# Patient Record
Sex: FEMALE | Race: WHITE | URBAN - METROPOLITAN AREA
[De-identification: names, ages, dates, MRNs, and addresses within clinical notes are randomized per-mention and may not be internally consistent; named-entity substitution may affect disease eponyms.]

---

## 2017-10-16 ENCOUNTER — FOLLOW UP ESTABLISHED (OUTPATIENT)
Dept: URBAN - METROPOLITAN AREA CLINIC 10 | Facility: CLINIC | Age: 62
End: 2017-10-16
Payer: COMMERCIAL

## 2017-10-16 PROCEDURE — 92015 DETERMINE REFRACTIVE STATE: CPT | Performed by: OPTOMETRIST

## 2017-10-16 PROCEDURE — 92014 COMPRE OPH EXAM EST PT 1/>: CPT | Performed by: OPTOMETRIST

## 2017-10-16 PROCEDURE — 92134 CPTRZ OPH DX IMG PST SGM RTA: CPT | Performed by: OPTOMETRIST

## 2017-10-16 ASSESSMENT — KERATOMETRY
OS: 44.88
OD: 44.50

## 2017-10-16 ASSESSMENT — INTRAOCULAR PRESSURE
OD: 17
OS: 18

## 2017-10-16 ASSESSMENT — VISUAL ACUITY
OS: 20/20
OD: 20/20

## 2018-10-19 ENCOUNTER — FOLLOW UP ESTABLISHED (OUTPATIENT)
Dept: URBAN - METROPOLITAN AREA CLINIC 10 | Facility: CLINIC | Age: 63
End: 2018-10-19
Payer: COMMERCIAL

## 2018-10-19 DIAGNOSIS — R73.03 OTHER ABNORMAL GLUCOSE: ICD-10-CM

## 2018-10-19 PROCEDURE — 92014 COMPRE OPH EXAM EST PT 1/>: CPT | Performed by: OPTOMETRIST

## 2018-10-19 PROCEDURE — 92134 CPTRZ OPH DX IMG PST SGM RTA: CPT | Performed by: OPTOMETRIST

## 2018-10-19 PROCEDURE — 92015 DETERMINE REFRACTIVE STATE: CPT | Performed by: OPTOMETRIST

## 2018-10-19 ASSESSMENT — INTRAOCULAR PRESSURE
OS: 20
OD: 17

## 2018-10-19 ASSESSMENT — VISUAL ACUITY
OS: 20/20
OD: 20/20

## 2018-10-19 ASSESSMENT — KERATOMETRY
OS: 44.88
OD: 44.38

## 2020-02-18 ENCOUNTER — FOLLOW UP ESTABLISHED (OUTPATIENT)
Dept: URBAN - METROPOLITAN AREA CLINIC 10 | Facility: CLINIC | Age: 65
End: 2020-02-18
Payer: COMMERCIAL

## 2020-02-18 DIAGNOSIS — H04.123 DRY EYE SYNDROME OF BILATERAL LACRIMAL GLANDS: ICD-10-CM

## 2020-02-18 PROCEDURE — 92014 COMPRE OPH EXAM EST PT 1/>: CPT | Performed by: OPTOMETRIST

## 2020-02-18 PROCEDURE — 92134 CPTRZ OPH DX IMG PST SGM RTA: CPT | Performed by: OPTOMETRIST

## 2020-02-18 RX ORDER — OLOPATADINE HYDROCHLORIDE 7 MG/ML
0.7 % SOLUTION OPHTHALMIC
Qty: 1 | Refills: 1 | Status: ACTIVE
Start: 2020-02-18

## 2020-02-18 ASSESSMENT — INTRAOCULAR PRESSURE
OD: 19
OS: 19

## 2020-02-18 ASSESSMENT — KERATOMETRY
OD: 44.63
OS: 45.00

## 2020-02-18 ASSESSMENT — VISUAL ACUITY
OD: 20/20
OS: 20/20

## 2022-03-02 ENCOUNTER — OFFICE VISIT (OUTPATIENT)
Dept: URBAN - METROPOLITAN AREA CLINIC 10 | Facility: CLINIC | Age: 67
End: 2022-03-02
Payer: COMMERCIAL

## 2022-03-02 DIAGNOSIS — H52.4 PRESBYOPIA: Primary | ICD-10-CM

## 2022-03-02 DIAGNOSIS — H35.363 DRUSEN (DEGENERATIVE) OF MACULA, BILATERAL: ICD-10-CM

## 2022-03-02 DIAGNOSIS — E11.9 TYPE 2 DIABETES MELLITUS WITHOUT COMPLICATIONS: ICD-10-CM

## 2022-03-02 DIAGNOSIS — Z79.84 LONG TERM (CURRENT) USE OF ORAL ANTIDIABETIC DRUGS: ICD-10-CM

## 2022-03-02 DIAGNOSIS — H25.813 COMBINED FORMS OF AGE-RELATED CATARACT, BILATERAL: ICD-10-CM

## 2022-03-02 PROCEDURE — 92014 COMPRE OPH EXAM EST PT 1/>: CPT | Performed by: STUDENT IN AN ORGANIZED HEALTH CARE EDUCATION/TRAINING PROGRAM

## 2022-03-02 ASSESSMENT — KERATOMETRY
OD: 45.00
OS: 45.13

## 2022-03-02 ASSESSMENT — INTRAOCULAR PRESSURE
OS: 19
OD: 19

## 2022-03-02 ASSESSMENT — VISUAL ACUITY
OD: 20/20
OS: 20/25

## 2022-03-02 NOTE — IMPRESSION/PLAN
Impression: Type 2 diabetes mellitus without complications Plan: No retinopathy on OPTOS noted. Patient educated on condition, importance of diet, exercise, and regular follow ups with PCP. Monitor at next DM DFE.

## 2022-03-02 NOTE — IMPRESSION/PLAN
Impression: Drusen (degenerative) of macula, bilateral Plan: Very mild fine drusen. Monitor. Discussed good nutrition and vits. Non-smoker. 
RTC for dilated medical exam

## 2022-09-02 ENCOUNTER — OFFICE VISIT (OUTPATIENT)
Dept: URBAN - METROPOLITAN AREA CLINIC 10 | Facility: CLINIC | Age: 67
End: 2022-09-02
Payer: COMMERCIAL

## 2022-09-02 DIAGNOSIS — E11.9 TYPE 2 DIABETES MELLITUS WITHOUT COMPLICATIONS: Primary | ICD-10-CM

## 2022-09-02 DIAGNOSIS — H52.4 PRESBYOPIA: ICD-10-CM

## 2022-09-02 DIAGNOSIS — H04.123 DRY EYE SYNDROME OF BILATERAL LACRIMAL GLANDS: ICD-10-CM

## 2022-09-02 DIAGNOSIS — Z79.84 LONG TERM (CURRENT) USE OF ORAL ANTIDIABETIC DRUGS: ICD-10-CM

## 2022-09-02 DIAGNOSIS — H25.813 COMBINED FORMS OF AGE-RELATED CATARACT, BILATERAL: ICD-10-CM

## 2022-09-02 DIAGNOSIS — H35.363 DRUSEN (DEGENERATIVE) OF MACULA, BILATERAL: ICD-10-CM

## 2022-09-02 PROCEDURE — 99214 OFFICE O/P EST MOD 30 MIN: CPT | Performed by: STUDENT IN AN ORGANIZED HEALTH CARE EDUCATION/TRAINING PROGRAM

## 2022-09-02 PROCEDURE — 92134 CPTRZ OPH DX IMG PST SGM RTA: CPT | Performed by: STUDENT IN AN ORGANIZED HEALTH CARE EDUCATION/TRAINING PROGRAM

## 2022-09-02 ASSESSMENT — VISUAL ACUITY
OD: 20/25
OS: 20/30

## 2022-09-02 ASSESSMENT — INTRAOCULAR PRESSURE
OD: 19
OS: 17

## 2022-09-02 NOTE — IMPRESSION/PLAN
Impression: Combined forms of age-related cataract, bilateral Plan: Patient educated on condition, at this time cataracts are not visually significant for patient. Discussed cataract surgery options in the future, continue to monitor at this time.

## 2022-09-02 NOTE — IMPRESSION/PLAN
Impression: Drusen (degenerative) of macula, bilateral Plan: Patient educated on condition, Mac OCT is stable at today's visit with no breaks.

## 2023-09-07 ENCOUNTER — OFFICE VISIT (OUTPATIENT)
Dept: URBAN - METROPOLITAN AREA CLINIC 10 | Facility: CLINIC | Age: 68
End: 2023-09-07
Payer: COMMERCIAL

## 2023-09-07 DIAGNOSIS — E11.9 TYPE 2 DIABETES MELLITUS WITHOUT COMPLICATIONS: Primary | ICD-10-CM

## 2023-09-07 DIAGNOSIS — H25.813 COMBINED FORMS OF AGE-RELATED CATARACT, BILATERAL: ICD-10-CM

## 2023-09-07 DIAGNOSIS — H35.363 DRUSEN (DEGENERATIVE) OF MACULA, BILATERAL: ICD-10-CM

## 2023-09-07 DIAGNOSIS — H04.123 DRY EYE SYNDROME OF BILATERAL LACRIMAL GLANDS: ICD-10-CM

## 2023-09-07 DIAGNOSIS — Z79.84 LONG TERM (CURRENT) USE OF ORAL ANTIDIABETIC DRUGS: ICD-10-CM

## 2023-09-07 PROCEDURE — 92014 COMPRE OPH EXAM EST PT 1/>: CPT | Performed by: STUDENT IN AN ORGANIZED HEALTH CARE EDUCATION/TRAINING PROGRAM

## 2023-09-07 PROCEDURE — 92134 CPTRZ OPH DX IMG PST SGM RTA: CPT | Performed by: STUDENT IN AN ORGANIZED HEALTH CARE EDUCATION/TRAINING PROGRAM

## 2023-09-07 ASSESSMENT — INTRAOCULAR PRESSURE
OD: 16
OS: 18

## 2023-09-07 ASSESSMENT — KERATOMETRY
OS: 45.00
OD: 44.63

## 2023-09-07 ASSESSMENT — VISUAL ACUITY
OS: 20/20
OD: 20/20

## 2025-02-21 ENCOUNTER — OFFICE VISIT (OUTPATIENT)
Dept: URBAN - METROPOLITAN AREA CLINIC 10 | Facility: CLINIC | Age: 70
End: 2025-02-21
Payer: MEDICARE

## 2025-02-21 DIAGNOSIS — H25.813 COMBINED FORMS OF AGE-RELATED CATARACT, BILATERAL: ICD-10-CM

## 2025-02-21 DIAGNOSIS — H04.123 DRY EYE SYNDROME OF BILATERAL LACRIMAL GLANDS: ICD-10-CM

## 2025-02-21 DIAGNOSIS — Z79.84 LONG TERM (CURRENT) USE OF ORAL ANTIDIABETIC DRUGS: ICD-10-CM

## 2025-02-21 DIAGNOSIS — H52.4 PRESBYOPIA: ICD-10-CM

## 2025-02-21 DIAGNOSIS — H35.363 DRUSEN (DEGENERATIVE) OF MACULA, BILATERAL: ICD-10-CM

## 2025-02-21 DIAGNOSIS — E11.9 TYPE 2 DIABETES MELLITUS WITHOUT COMPLICATIONS: Primary | ICD-10-CM

## 2025-02-21 PROCEDURE — 99214 OFFICE O/P EST MOD 30 MIN: CPT | Performed by: STUDENT IN AN ORGANIZED HEALTH CARE EDUCATION/TRAINING PROGRAM

## 2025-02-21 ASSESSMENT — VISUAL ACUITY
OD: 20/20
OS: 20/25

## 2025-02-21 ASSESSMENT — INTRAOCULAR PRESSURE
OD: 18
OS: 17